# Patient Record
Sex: FEMALE | Race: WHITE | Employment: FULL TIME | URBAN - METROPOLITAN AREA
[De-identification: names, ages, dates, MRNs, and addresses within clinical notes are randomized per-mention and may not be internally consistent; named-entity substitution may affect disease eponyms.]

---

## 2017-10-23 ENCOUNTER — APPOINTMENT (OUTPATIENT)
Dept: SOCIAL WORK | Facility: CLINIC | Age: 27
End: 2017-10-23
Payer: COMMERCIAL

## 2017-10-23 PROCEDURE — 90686 IIV4 VACC NO PRSV 0.5 ML IM: CPT | Performed by: REGISTERED NURSE

## 2017-10-23 PROCEDURE — 90471 IMMUNIZATION ADMIN: CPT | Performed by: REGISTERED NURSE

## 2017-12-11 ENCOUNTER — HOSPITAL ENCOUNTER (OUTPATIENT)
Dept: LAB | Facility: MEDICAL CENTER | Age: 27
End: 2017-12-11
Attending: OBSTETRICS & GYNECOLOGY
Payer: COMMERCIAL

## 2017-12-11 LAB
ABO GROUP BLD: NORMAL
APPEARANCE UR: ABNORMAL
BACTERIA #/AREA URNS HPF: ABNORMAL /HPF
BASOPHILS # BLD AUTO: 0.5 % (ref 0–1.8)
BASOPHILS # BLD: 0.04 K/UL (ref 0–0.12)
BILIRUB UR QL STRIP.AUTO: NEGATIVE
BLD GP AB SCN SERPL QL: NORMAL
COLOR UR: ABNORMAL
CULTURE IF INDICATED INDCX: YES UA CULTURE
EOSINOPHIL # BLD AUTO: 0.09 K/UL (ref 0–0.51)
EOSINOPHIL NFR BLD: 1.2 % (ref 0–6.9)
EPI CELLS #/AREA URNS HPF: ABNORMAL /HPF
ERYTHROCYTE [DISTWIDTH] IN BLOOD BY AUTOMATED COUNT: 41.5 FL (ref 35.9–50)
GLUCOSE UR STRIP.AUTO-MCNC: NEGATIVE MG/DL
HBV SURFACE AG SER QL: NEGATIVE
HCT VFR BLD AUTO: 40.9 % (ref 37–47)
HCV AB SER QL: NEGATIVE
HGB BLD-MCNC: 13.8 G/DL (ref 12–16)
HIV 1+2 AB+HIV1 P24 AG SERPL QL IA: NON REACTIVE
HYALINE CASTS #/AREA URNS LPF: ABNORMAL /LPF
IMM GRANULOCYTES # BLD AUTO: 0.02 K/UL (ref 0–0.11)
IMM GRANULOCYTES NFR BLD AUTO: 0.3 % (ref 0–0.9)
KETONES UR STRIP.AUTO-MCNC: NEGATIVE MG/DL
LEUKOCYTE ESTERASE UR QL STRIP.AUTO: NEGATIVE
LYMPHOCYTES # BLD AUTO: 1.86 K/UL (ref 1–4.8)
LYMPHOCYTES NFR BLD: 24.3 % (ref 22–41)
MCH RBC QN AUTO: 27.4 PG (ref 27–33)
MCHC RBC AUTO-ENTMCNC: 33.7 G/DL (ref 33.6–35)
MCV RBC AUTO: 81.3 FL (ref 81.4–97.8)
MICRO URNS: ABNORMAL
MONOCYTES # BLD AUTO: 0.44 K/UL (ref 0–0.85)
MONOCYTES NFR BLD AUTO: 5.8 % (ref 0–13.4)
NEUTROPHILS # BLD AUTO: 5.2 K/UL (ref 2–7.15)
NEUTROPHILS NFR BLD: 67.9 % (ref 44–72)
NITRITE UR QL STRIP.AUTO: NEGATIVE
NRBC # BLD AUTO: 0 K/UL
NRBC BLD AUTO-RTO: 0 /100 WBC
PH UR STRIP.AUTO: 8 [PH]
PLATELET # BLD AUTO: 250 K/UL (ref 164–446)
PMV BLD AUTO: 10.2 FL (ref 9–12.9)
PROT UR QL STRIP: NEGATIVE MG/DL
RBC # BLD AUTO: 5.03 M/UL (ref 4.2–5.4)
RBC # URNS HPF: ABNORMAL /HPF
RBC UR QL AUTO: NEGATIVE
RH BLD: NORMAL
RUBV AB SER QL: 140.3 IU/ML
SP GR UR STRIP.AUTO: 1.03
TREPONEMA PALLIDUM IGG+IGM AB [PRESENCE] IN SERUM OR PLASMA BY IMMUNOASSAY: NON REACTIVE
UROBILINOGEN UR STRIP.AUTO-MCNC: 1 MG/DL
WBC # BLD AUTO: 7.7 K/UL (ref 4.8–10.8)
WBC #/AREA URNS HPF: ABNORMAL /HPF

## 2017-12-11 PROCEDURE — 36415 COLL VENOUS BLD VENIPUNCTURE: CPT

## 2017-12-11 PROCEDURE — 86850 RBC ANTIBODY SCREEN: CPT

## 2017-12-11 PROCEDURE — 87340 HEPATITIS B SURFACE AG IA: CPT

## 2017-12-11 PROCEDURE — 86900 BLOOD TYPING SEROLOGIC ABO: CPT

## 2017-12-11 PROCEDURE — 86762 RUBELLA ANTIBODY: CPT

## 2017-12-11 PROCEDURE — 86803 HEPATITIS C AB TEST: CPT

## 2017-12-11 PROCEDURE — 87389 HIV-1 AG W/HIV-1&-2 AB AG IA: CPT

## 2017-12-11 PROCEDURE — 87086 URINE CULTURE/COLONY COUNT: CPT

## 2017-12-11 PROCEDURE — 86901 BLOOD TYPING SEROLOGIC RH(D): CPT

## 2017-12-11 PROCEDURE — 83036 HEMOGLOBIN GLYCOSYLATED A1C: CPT

## 2017-12-11 PROCEDURE — 86780 TREPONEMA PALLIDUM: CPT

## 2017-12-11 PROCEDURE — 85025 COMPLETE CBC W/AUTO DIFF WBC: CPT

## 2017-12-11 PROCEDURE — 81001 URINALYSIS AUTO W/SCOPE: CPT

## 2017-12-12 LAB
EST. AVERAGE GLUCOSE BLD GHB EST-MCNC: 105 MG/DL
HBA1C MFR BLD: 5.3 % (ref 0–5.6)

## 2017-12-13 LAB
BACTERIA UR CULT: NORMAL
SIGNIFICANT IND 70042: NORMAL
SOURCE SOURCE: NORMAL

## 2018-04-05 ENCOUNTER — HOSPITAL ENCOUNTER (OUTPATIENT)
Dept: LAB | Facility: MEDICAL CENTER | Age: 28
End: 2018-04-05
Attending: OBSTETRICS & GYNECOLOGY
Payer: COMMERCIAL

## 2018-04-05 LAB
BASOPHILS # BLD AUTO: 0.4 % (ref 0–1.8)
BASOPHILS # BLD: 0.04 K/UL (ref 0–0.12)
EOSINOPHIL # BLD AUTO: 0.1 K/UL (ref 0–0.51)
EOSINOPHIL NFR BLD: 1 % (ref 0–6.9)
ERYTHROCYTE [DISTWIDTH] IN BLOOD BY AUTOMATED COUNT: 44 FL (ref 35.9–50)
GLUCOSE 1H P 50 G GLC PO SERPL-MCNC: 119 MG/DL (ref 70–139)
HCT VFR BLD AUTO: 39.2 % (ref 37–47)
HGB BLD-MCNC: 12.7 G/DL (ref 12–16)
IMM GRANULOCYTES # BLD AUTO: 0.07 K/UL (ref 0–0.11)
IMM GRANULOCYTES NFR BLD AUTO: 0.7 % (ref 0–0.9)
LYMPHOCYTES # BLD AUTO: 1.63 K/UL (ref 1–4.8)
LYMPHOCYTES NFR BLD: 17 % (ref 22–41)
MCH RBC QN AUTO: 27.5 PG (ref 27–33)
MCHC RBC AUTO-ENTMCNC: 32.4 G/DL (ref 33.6–35)
MCV RBC AUTO: 84.8 FL (ref 81.4–97.8)
MONOCYTES # BLD AUTO: 0.54 K/UL (ref 0–0.85)
MONOCYTES NFR BLD AUTO: 5.6 % (ref 0–13.4)
NEUTROPHILS # BLD AUTO: 7.19 K/UL (ref 2–7.15)
NEUTROPHILS NFR BLD: 75.3 % (ref 44–72)
NRBC # BLD AUTO: 0 K/UL
NRBC BLD-RTO: 0 /100 WBC
PLATELET # BLD AUTO: 238 K/UL (ref 164–446)
PMV BLD AUTO: 10.9 FL (ref 9–12.9)
RBC # BLD AUTO: 4.62 M/UL (ref 4.2–5.4)
WBC # BLD AUTO: 9.6 K/UL (ref 4.8–10.8)

## 2018-04-05 PROCEDURE — 85025 COMPLETE CBC W/AUTO DIFF WBC: CPT

## 2018-04-05 PROCEDURE — 36415 COLL VENOUS BLD VENIPUNCTURE: CPT

## 2018-04-05 PROCEDURE — 82950 GLUCOSE TEST: CPT

## 2018-06-21 ENCOUNTER — HOSPITAL ENCOUNTER (INPATIENT)
Facility: MEDICAL CENTER | Age: 28
LOS: 3 days | End: 2018-06-24
Attending: OBSTETRICS & GYNECOLOGY | Admitting: OBSTETRICS & GYNECOLOGY
Payer: COMMERCIAL

## 2018-06-21 DIAGNOSIS — G89.18 POSTOPERATIVE PAIN: ICD-10-CM

## 2018-06-21 LAB
BASOPHILS # BLD AUTO: 0.3 % (ref 0–1.8)
BASOPHILS # BLD: 0.04 K/UL (ref 0–0.12)
EOSINOPHIL # BLD AUTO: 0.08 K/UL (ref 0–0.51)
EOSINOPHIL NFR BLD: 0.7 % (ref 0–6.9)
ERYTHROCYTE [DISTWIDTH] IN BLOOD BY AUTOMATED COUNT: 43.8 FL (ref 35.9–50)
HCT VFR BLD AUTO: 39 % (ref 37–47)
HGB BLD-MCNC: 13 G/DL (ref 12–16)
HOLDING TUBE BB 8507: NORMAL
IMM GRANULOCYTES # BLD AUTO: 0.16 K/UL (ref 0–0.11)
IMM GRANULOCYTES NFR BLD AUTO: 1.3 % (ref 0–0.9)
LYMPHOCYTES # BLD AUTO: 1.74 K/UL (ref 1–4.8)
LYMPHOCYTES NFR BLD: 14.6 % (ref 22–41)
MCH RBC QN AUTO: 27 PG (ref 27–33)
MCHC RBC AUTO-ENTMCNC: 33.3 G/DL (ref 33.6–35)
MCV RBC AUTO: 81.1 FL (ref 81.4–97.8)
MONOCYTES # BLD AUTO: 0.71 K/UL (ref 0–0.85)
MONOCYTES NFR BLD AUTO: 6 % (ref 0–13.4)
NEUTROPHILS # BLD AUTO: 9.19 K/UL (ref 2–7.15)
NEUTROPHILS NFR BLD: 77.1 % (ref 44–72)
NRBC # BLD AUTO: 0 K/UL
NRBC BLD-RTO: 0 /100 WBC
PLATELET # BLD AUTO: 202 K/UL (ref 164–446)
PMV BLD AUTO: 11 FL (ref 9–12.9)
RBC # BLD AUTO: 4.81 M/UL (ref 4.2–5.4)
WBC # BLD AUTO: 11.9 K/UL (ref 4.8–10.8)

## 2018-06-21 PROCEDURE — 700102 HCHG RX REV CODE 250 W/ 637 OVERRIDE(OP): Performed by: OBSTETRICS & GYNECOLOGY

## 2018-06-21 PROCEDURE — 4A1HXCZ MONITORING OF PRODUCTS OF CONCEPTION, CARDIAC RATE, EXTERNAL APPROACH: ICD-10-PCS | Performed by: OBSTETRICS & GYNECOLOGY

## 2018-06-21 PROCEDURE — 700105 HCHG RX REV CODE 258: Performed by: ANESTHESIOLOGY

## 2018-06-21 PROCEDURE — 302135 SEQUENTIAL COMPRESSION MACHINE: Performed by: OBSTETRICS & GYNECOLOGY

## 2018-06-21 PROCEDURE — 700111 HCHG RX REV CODE 636 W/ 250 OVERRIDE (IP)

## 2018-06-21 PROCEDURE — 304966 HCHG RECOVERY SVSC TIME ADDL 1/2 HR: Performed by: OBSTETRICS & GYNECOLOGY

## 2018-06-21 PROCEDURE — 700102 HCHG RX REV CODE 250 W/ 637 OVERRIDE(OP): Performed by: ANESTHESIOLOGY

## 2018-06-21 PROCEDURE — 36415 COLL VENOUS BLD VENIPUNCTURE: CPT

## 2018-06-21 PROCEDURE — 305385 HCHG SURGICAL SERVICES 1/4 HOUR: Performed by: OBSTETRICS & GYNECOLOGY

## 2018-06-21 PROCEDURE — 59514 CESAREAN DELIVERY ONLY: CPT

## 2018-06-21 PROCEDURE — A9270 NON-COVERED ITEM OR SERVICE: HCPCS | Performed by: OBSTETRICS & GYNECOLOGY

## 2018-06-21 PROCEDURE — 700101 HCHG RX REV CODE 250

## 2018-06-21 PROCEDURE — 770002 HCHG ROOM/CARE - OB PRIVATE (112)

## 2018-06-21 PROCEDURE — 700111 HCHG RX REV CODE 636 W/ 250 OVERRIDE (IP): Performed by: ANESTHESIOLOGY

## 2018-06-21 PROCEDURE — 304964 HCHG RECOVERY ROOM TIME 1HR: Performed by: OBSTETRICS & GYNECOLOGY

## 2018-06-21 PROCEDURE — 85025 COMPLETE CBC W/AUTO DIFF WBC: CPT

## 2018-06-21 PROCEDURE — 306287 HCHG RETRACTOR C SECTION XL

## 2018-06-21 PROCEDURE — 306828 HCHG ANES-TIME GENERAL: Performed by: OBSTETRICS & GYNECOLOGY

## 2018-06-21 RX ORDER — METOCLOPRAMIDE HYDROCHLORIDE 5 MG/ML
10 INJECTION INTRAMUSCULAR; INTRAVENOUS EVERY 6 HOURS PRN
Status: CANCELLED | OUTPATIENT
Start: 2018-06-21

## 2018-06-21 RX ORDER — OXYCODONE AND ACETAMINOPHEN 10; 325 MG/1; MG/1
1 TABLET ORAL EVERY 4 HOURS PRN
Status: DISCONTINUED | OUTPATIENT
Start: 2018-06-21 | End: 2018-06-24 | Stop reason: HOSPADM

## 2018-06-21 RX ORDER — SODIUM CHLORIDE, SODIUM GLUCONATE, SODIUM ACETATE, POTASSIUM CHLORIDE AND MAGNESIUM CHLORIDE 526; 502; 368; 37; 30 MG/100ML; MG/100ML; MG/100ML; MG/100ML; MG/100ML
1500 INJECTION, SOLUTION INTRAVENOUS ONCE
Status: COMPLETED | OUTPATIENT
Start: 2018-06-21 | End: 2018-06-21

## 2018-06-21 RX ORDER — METHYLERGONOVINE MALEATE 0.2 MG/ML
0.2 INJECTION INTRAVENOUS
Status: DISCONTINUED | OUTPATIENT
Start: 2018-06-21 | End: 2018-06-21 | Stop reason: HOSPADM

## 2018-06-21 RX ORDER — CARBOPROST TROMETHAMINE 250 UG/ML
250 INJECTION, SOLUTION INTRAMUSCULAR
Status: DISCONTINUED | OUTPATIENT
Start: 2018-06-21 | End: 2018-06-24 | Stop reason: HOSPADM

## 2018-06-21 RX ORDER — METHYLERGONOVINE MALEATE 0.2 MG/ML
0.2 INJECTION INTRAVENOUS
Status: DISCONTINUED | OUTPATIENT
Start: 2018-06-21 | End: 2018-06-24 | Stop reason: HOSPADM

## 2018-06-21 RX ORDER — ONDANSETRON 4 MG/1
4 TABLET, ORALLY DISINTEGRATING ORAL EVERY 6 HOURS PRN
Status: DISCONTINUED | OUTPATIENT
Start: 2018-06-21 | End: 2018-06-24 | Stop reason: HOSPADM

## 2018-06-21 RX ORDER — MISOPROSTOL 200 UG/1
600 TABLET ORAL
Status: DISCONTINUED | OUTPATIENT
Start: 2018-06-21 | End: 2018-06-24 | Stop reason: HOSPADM

## 2018-06-21 RX ORDER — ACETAMINOPHEN 325 MG/1
325 TABLET ORAL EVERY 4 HOURS PRN
Status: CANCELLED | OUTPATIENT
Start: 2018-06-21

## 2018-06-21 RX ORDER — CITRIC ACID/SODIUM CITRATE 334-500MG
30 SOLUTION, ORAL ORAL ONCE
Status: COMPLETED | OUTPATIENT
Start: 2018-06-21 | End: 2018-06-21

## 2018-06-21 RX ORDER — ONDANSETRON 2 MG/ML
4 INJECTION INTRAMUSCULAR; INTRAVENOUS EVERY 6 HOURS PRN
Status: DISCONTINUED | OUTPATIENT
Start: 2018-06-21 | End: 2018-06-24 | Stop reason: HOSPADM

## 2018-06-21 RX ORDER — OXYCODONE HYDROCHLORIDE AND ACETAMINOPHEN 5; 325 MG/1; MG/1
2 TABLET ORAL EVERY 4 HOURS PRN
Status: DISCONTINUED | OUTPATIENT
Start: 2018-06-21 | End: 2018-06-24 | Stop reason: HOSPADM

## 2018-06-21 RX ORDER — IBUPROFEN 600 MG/1
600 TABLET ORAL EVERY 6 HOURS PRN
Status: DISCONTINUED | OUTPATIENT
Start: 2018-06-21 | End: 2018-06-24 | Stop reason: HOSPADM

## 2018-06-21 RX ORDER — METOCLOPRAMIDE HYDROCHLORIDE 5 MG/ML
10 INJECTION INTRAMUSCULAR; INTRAVENOUS ONCE
Status: COMPLETED | OUTPATIENT
Start: 2018-06-21 | End: 2018-06-21

## 2018-06-21 RX ORDER — SIMETHICONE 80 MG
80 TABLET,CHEWABLE ORAL 4 TIMES DAILY PRN
Status: CANCELLED | OUTPATIENT
Start: 2018-06-21

## 2018-06-21 RX ORDER — OXYCODONE HYDROCHLORIDE AND ACETAMINOPHEN 5; 325 MG/1; MG/1
1 TABLET ORAL EVERY 4 HOURS PRN
Status: CANCELLED | OUTPATIENT
Start: 2018-06-21

## 2018-06-21 RX ORDER — MAG HYDROX/ALUMINUM HYD/SIMETH 400-400-40
1 SUSPENSION, ORAL (FINAL DOSE FORM) ORAL
Status: DISCONTINUED | OUTPATIENT
Start: 2018-06-21 | End: 2018-06-24 | Stop reason: HOSPADM

## 2018-06-21 RX ORDER — MEPERIDINE HYDROCHLORIDE 25 MG/ML
12.5 INJECTION INTRAMUSCULAR; INTRAVENOUS; SUBCUTANEOUS ONCE
Status: COMPLETED | OUTPATIENT
Start: 2018-06-21 | End: 2018-06-21

## 2018-06-21 RX ORDER — SODIUM CHLORIDE, SODIUM LACTATE, POTASSIUM CHLORIDE, CALCIUM CHLORIDE 600; 310; 30; 20 MG/100ML; MG/100ML; MG/100ML; MG/100ML
INJECTION, SOLUTION INTRAVENOUS PRN
Status: DISCONTINUED | OUTPATIENT
Start: 2018-06-21 | End: 2018-06-24 | Stop reason: HOSPADM

## 2018-06-21 RX ORDER — KETOROLAC TROMETHAMINE 30 MG/ML
INJECTION, SOLUTION INTRAMUSCULAR; INTRAVENOUS
Status: COMPLETED
Start: 2018-06-21 | End: 2018-06-21

## 2018-06-21 RX ORDER — SODIUM CHLORIDE, SODIUM LACTATE, POTASSIUM CHLORIDE, CALCIUM CHLORIDE 600; 310; 30; 20 MG/100ML; MG/100ML; MG/100ML; MG/100ML
INJECTION, SOLUTION INTRAVENOUS CONTINUOUS
Status: DISCONTINUED | OUTPATIENT
Start: 2018-06-21 | End: 2018-06-21 | Stop reason: HOSPADM

## 2018-06-21 RX ORDER — DOCUSATE SODIUM 100 MG/1
100 CAPSULE, LIQUID FILLED ORAL 2 TIMES DAILY PRN
Status: CANCELLED | OUTPATIENT
Start: 2018-06-21

## 2018-06-21 RX ORDER — METHYLERGONOVINE MALEATE 0.2 MG/ML
0.2 INJECTION INTRAVENOUS
Status: CANCELLED | OUTPATIENT
Start: 2018-06-21

## 2018-06-21 RX ORDER — KETOROLAC TROMETHAMINE 30 MG/ML
30 INJECTION, SOLUTION INTRAMUSCULAR; INTRAVENOUS ONCE
Status: COMPLETED | OUTPATIENT
Start: 2018-06-21 | End: 2018-06-21

## 2018-06-21 RX ORDER — MISOPROSTOL 200 UG/1
1000 TABLET ORAL
Status: DISCONTINUED | OUTPATIENT
Start: 2018-06-21 | End: 2018-06-21 | Stop reason: HOSPADM

## 2018-06-21 RX ORDER — DOCUSATE SODIUM 100 MG/1
100 CAPSULE, LIQUID FILLED ORAL 2 TIMES DAILY PRN
Status: DISCONTINUED | OUTPATIENT
Start: 2018-06-21 | End: 2018-06-24 | Stop reason: HOSPADM

## 2018-06-21 RX ORDER — ACETAMINOPHEN 325 MG/1
650 TABLET ORAL EVERY 4 HOURS PRN
Status: DISCONTINUED | OUTPATIENT
Start: 2018-06-21 | End: 2018-06-24 | Stop reason: HOSPADM

## 2018-06-21 RX ORDER — SODIUM CHLORIDE, SODIUM LACTATE, POTASSIUM CHLORIDE, CALCIUM CHLORIDE 600; 310; 30; 20 MG/100ML; MG/100ML; MG/100ML; MG/100ML
INJECTION, SOLUTION INTRAVENOUS PRN
Status: CANCELLED | OUTPATIENT
Start: 2018-06-21

## 2018-06-21 RX ORDER — MEPERIDINE HYDROCHLORIDE 25 MG/ML
INJECTION INTRAMUSCULAR; INTRAVENOUS; SUBCUTANEOUS
Status: COMPLETED
Start: 2018-06-21 | End: 2018-06-21

## 2018-06-21 RX ORDER — OXYCODONE HCL 5 MG/5 ML
10 SOLUTION, ORAL ORAL
Status: DISCONTINUED | OUTPATIENT
Start: 2018-06-21 | End: 2018-06-24 | Stop reason: HOSPADM

## 2018-06-21 RX ADMIN — KETOROLAC TROMETHAMINE 30 MG: 30 INJECTION, SOLUTION INTRAMUSCULAR; INTRAVENOUS at 20:52

## 2018-06-21 RX ADMIN — METOCLOPRAMIDE 10 MG: 5 INJECTION, SOLUTION INTRAMUSCULAR; INTRAVENOUS at 19:10

## 2018-06-21 RX ADMIN — SODIUM CHLORIDE, SODIUM GLUCONATE, SODIUM ACETATE, POTASSIUM CHLORIDE AND MAGNESIUM CHLORIDE 1500 ML: 526; 502; 368; 37; 30 INJECTION, SOLUTION INTRAVENOUS at 18:27

## 2018-06-21 RX ADMIN — KETOROLAC TROMETHAMINE 30 MG: 30 INJECTION, SOLUTION INTRAMUSCULAR at 20:52

## 2018-06-21 RX ADMIN — FENTANYL CITRATE 25 MCG: 50 INJECTION INTRAMUSCULAR; INTRAVENOUS at 21:56

## 2018-06-21 RX ADMIN — FAMOTIDINE 20 MG: 10 INJECTION INTRAVENOUS at 19:09

## 2018-06-21 RX ADMIN — MISOPROSTOL 1000 MCG: 200 TABLET ORAL at 20:21

## 2018-06-21 RX ADMIN — MEPERIDINE HYDROCHLORIDE 12.5 MG: 25 INJECTION INTRAMUSCULAR; INTRAVENOUS; SUBCUTANEOUS at 20:52

## 2018-06-21 RX ADMIN — SODIUM CITRATE AND CITRIC ACID MONOHYDRATE 30 ML: 500; 334 SOLUTION ORAL at 19:09

## 2018-06-21 RX ADMIN — FENTANYL CITRATE 75 MCG: 50 INJECTION, SOLUTION INTRAMUSCULAR; INTRAVENOUS at 21:12

## 2018-06-21 ASSESSMENT — PAIN SCALES - GENERAL
PAINLEVEL_OUTOF10: 2
PAINLEVEL_OUTOF10: 3
PAINLEVEL_OUTOF10: 4
PAINLEVEL_OUTOF10: 8

## 2018-06-21 ASSESSMENT — PATIENT HEALTH QUESTIONNAIRE - PHQ9
SUM OF ALL RESPONSES TO PHQ9 QUESTIONS 1 AND 2: 0
2. FEELING DOWN, DEPRESSED, IRRITABLE, OR HOPELESS: NOT AT ALL
1. LITTLE INTEREST OR PLEASURE IN DOING THINGS: NOT AT ALL

## 2018-06-21 ASSESSMENT — COPD QUESTIONNAIRES
DURING THE PAST 4 WEEKS HOW MUCH DID YOU FEEL SHORT OF BREATH: NONE/LITTLE OF THE TIME
IN THE PAST 12 MONTHS DO YOU DO LESS THAN YOU USED TO BECAUSE OF YOUR BREATHING PROBLEMS: DISAGREE/UNSURE
HAVE YOU SMOKED AT LEAST 100 CIGARETTES IN YOUR ENTIRE LIFE: NO/DON'T KNOW
DO YOU EVER COUGH UP ANY MUCUS OR PHLEGM?: NO/ONLY WITH OCCASIONAL COLDS OR INFECTIONS
COPD SCREENING SCORE: 0

## 2018-06-21 ASSESSMENT — LIFESTYLE VARIABLES
ALCOHOL_USE: NO
EVER_SMOKED: NEVER

## 2018-06-22 LAB
ERYTHROCYTE [DISTWIDTH] IN BLOOD BY AUTOMATED COUNT: 42.9 FL (ref 35.9–50)
HCT VFR BLD AUTO: 31.6 % (ref 37–47)
HGB BLD-MCNC: 10.6 G/DL (ref 12–16)
MCH RBC QN AUTO: 27 PG (ref 27–33)
MCHC RBC AUTO-ENTMCNC: 33.5 G/DL (ref 33.6–35)
MCV RBC AUTO: 80.6 FL (ref 81.4–97.8)
PLATELET # BLD AUTO: 164 K/UL (ref 164–446)
PMV BLD AUTO: 9.8 FL (ref 9–12.9)
RBC # BLD AUTO: 3.92 M/UL (ref 4.2–5.4)
WBC # BLD AUTO: 12.1 K/UL (ref 4.8–10.8)

## 2018-06-22 PROCEDURE — A9270 NON-COVERED ITEM OR SERVICE: HCPCS | Performed by: OBSTETRICS & GYNECOLOGY

## 2018-06-22 PROCEDURE — 770002 HCHG ROOM/CARE - OB PRIVATE (112)

## 2018-06-22 PROCEDURE — 36415 COLL VENOUS BLD VENIPUNCTURE: CPT

## 2018-06-22 PROCEDURE — 85027 COMPLETE CBC AUTOMATED: CPT

## 2018-06-22 PROCEDURE — 700102 HCHG RX REV CODE 250 W/ 637 OVERRIDE(OP): Performed by: OBSTETRICS & GYNECOLOGY

## 2018-06-22 RX ADMIN — IBUPROFEN 600 MG: 600 TABLET, FILM COATED ORAL at 02:46

## 2018-06-22 RX ADMIN — OXYCODONE HYDROCHLORIDE AND ACETAMINOPHEN 1 TABLET: 10; 325 TABLET ORAL at 09:50

## 2018-06-22 RX ADMIN — OXYCODONE HYDROCHLORIDE AND ACETAMINOPHEN 1 TABLET: 10; 325 TABLET ORAL at 00:33

## 2018-06-22 RX ADMIN — OXYCODONE HYDROCHLORIDE AND ACETAMINOPHEN 1 TABLET: 10; 325 TABLET ORAL at 04:43

## 2018-06-22 RX ADMIN — OXYCODONE HYDROCHLORIDE AND ACETAMINOPHEN 1 TABLET: 10; 325 TABLET ORAL at 19:54

## 2018-06-22 RX ADMIN — OXYCODONE AND ACETAMINOPHEN 2 TABLET: 5; 325 TABLET ORAL at 14:05

## 2018-06-22 RX ADMIN — IBUPROFEN 600 MG: 600 TABLET, FILM COATED ORAL at 19:54

## 2018-06-22 RX ADMIN — IBUPROFEN 600 MG: 600 TABLET, FILM COATED ORAL at 09:51

## 2018-06-22 ASSESSMENT — PAIN SCALES - GENERAL
PAINLEVEL_OUTOF10: 0
PAINLEVEL_OUTOF10: 1
PAINLEVEL_OUTOF10: 7
PAINLEVEL_OUTOF10: 7
PAINLEVEL_OUTOF10: 2
PAINLEVEL_OUTOF10: 7
PAINLEVEL_OUTOF10: 2
PAINLEVEL_OUTOF10: 2
PAINLEVEL_OUTOF10: 6
PAINLEVEL_OUTOF10: 7

## 2018-06-22 ASSESSMENT — PATIENT HEALTH QUESTIONNAIRE - PHQ9
SUM OF ALL RESPONSES TO PHQ9 QUESTIONS 1 AND 2: 0
2. FEELING DOWN, DEPRESSED, IRRITABLE, OR HOPELESS: NOT AT ALL
SUM OF ALL RESPONSES TO PHQ9 QUESTIONS 1 AND 2: 0
2. FEELING DOWN, DEPRESSED, IRRITABLE, OR HOPELESS: NOT AT ALL
1. LITTLE INTEREST OR PLEASURE IN DOING THINGS: NOT AT ALL
1. LITTLE INTEREST OR PLEASURE IN DOING THINGS: NOT AT ALL

## 2018-06-22 NOTE — PROGRESS NOTES
"L&D Progress Note    Name:   Jenna Unger   Date/Time:  6/21/2018 7:09 PM  Gestational Age:  Unknown  Admit Date:   6/21/2018  Admitting Dx:   Pregnancy  Labor and delivery, indication for care  Repeat C/S, Laboring/Term    Subjective:  Pt is feeling more painful cramps that worsened about an hour ago.   Uterine contractions: yes  Pain:    yes    Objective:   Vitals:    06/21/18 1250   BP: 133/67   Pulse: 87   Weight: (!) 125.6 kg (277 lb)   Height: 1.651 m (5' 5\")     FHT:140's category  [place]: q 3mins    Pt wants to proceed with repeat c/s. She has had a previous c/s and her contractions are worsening. Proceed with Repeat c/s. R/B/I/A d/w pt and spouse and all questions answered.         Labs:  Recent Results (from the past 72 hour(s))   Hold Blood Bank Specimen (Not Tested)    Collection Time: 06/21/18  2:00 PM   Result Value Ref Range    Holding Tube - Bb DONE    CBC with Differential    Collection Time: 06/21/18  2:00 PM   Result Value Ref Range    WBC 11.9 (H) 4.8 - 10.8 K/uL    RBC 4.81 4.20 - 5.40 M/uL    Hemoglobin 13.0 12.0 - 16.0 g/dL    Hematocrit 39.0 37.0 - 47.0 %    MCV 81.1 (L) 81.4 - 97.8 fL    MCH 27.0 27.0 - 33.0 pg    MCHC 33.3 (L) 33.6 - 35.0 g/dL    RDW 43.8 35.9 - 50.0 fL    Platelet Count 202 164 - 446 K/uL    MPV 11.0 9.0 - 12.9 fL    Neutrophils-Polys 77.10 (H) 44.00 - 72.00 %    Lymphocytes 14.60 (L) 22.00 - 41.00 %    Monocytes 6.00 0.00 - 13.40 %    Eosinophils 0.70 0.00 - 6.90 %    Basophils 0.30 0.00 - 1.80 %    Immature Granulocytes 1.30 (H) 0.00 - 0.90 %    Nucleated RBC 0.00 /100 WBC    Neutrophils (Absolute) 9.19 (H) 2.00 - 7.15 K/uL    Lymphs (Absolute) 1.74 1.00 - 4.80 K/uL    Monos (Absolute) 0.71 0.00 - 0.85 K/uL    Eos (Absolute) 0.08 0.00 - 0.51 K/uL    Baso (Absolute) 0.04 0.00 - 0.12 K/uL    Immature Granulocytes (abs) 0.16 (H) 0.00 - 0.11 K/uL    NRBC (Absolute) 0.00 K/uL         Assessment:   Gestational Age:   Term pregnancy    Pregnancy " Complications: Obesity    Plan:    Anticipate  for previous c/s and laboring at 3 cm dilated.  delivery type    There are no active problems to display for this patient.      Rosemarie Penn M.D.

## 2018-06-22 NOTE — PROGRESS NOTES
1245-pt presents from the office for repeat  section, pt states that she was checked in the office and was told that she was 3 cm and that she was 2 cm last week, so Dr Penn wants to do her c/s today, pt states that she is feeling cramps but that they are not very strong, no c/o leaking, bleeding or other pain, states baby is moving normally, placed on external monitors, vs taken, SVE 3/70/-2  1330-TC Dr Penn, report given, no cervical change from the office this morning, admission for c/s ordered received   1400-report given to ALISSON Gonsalez RN, POC discussed

## 2018-06-22 NOTE — OR SURGEON
Immediate Post OP Note    PreOp Diagnosis: iup at 37.6  Previous c/s  Early labor with worsening contractions  Morbidly obese    PostOp Diagnosis: same  Uterine atony. Resolved with IV pitocin, methergine IM x1,cytotec 1,000 mcg per rectum and 1 gram traxanemic acid    Procedure(s):  REPEAT C SECTION    Surgeon(s):  Rosemarie Penn M.D.    Anesthesiologist/Type of Anesthesia:  Anesthesiologist: (Unknown)/Failed Spinal/Failed epidural, GETA  Surgical Staff:  Circulator: (Unknown)  Scrub Person: (Unknown)    Specimens removed if any:  gases    Estimated Blood Loss: 1 liter  UO: 125 cc clear  IVF: 2 liters LR    Findings:vtx, female, clear nomi, apgars 6 and 8, 6 #8oz, nml tubes, ovaries and uterus    Complications: none        6/21/2018 8:36 PM Rosemarie Penn M.D.

## 2018-06-22 NOTE — PROGRESS NOTES
"Received bedside report  from \"RAD Norton\"  on patient and assumed care.  Pt denies any pain or any questions or needs at this time.  "

## 2018-06-22 NOTE — PROGRESS NOTES
Sequentials off, Assisted out of bed, sat on toilet, pericare done, nicholson removed.  Advised she needs to drink more water and void in hat within 6 hours and pt verbalizes understanding.

## 2018-06-22 NOTE — PROGRESS NOTES
"1400 - report from MALIA Dominguez RN. FOB Tl at bedside. POC discussed, questions encouraged and answered, understanding verbalized. Plan currently to go back for repeat c/s for active labor, after 1700 case when Dr. Penn is through with clinic. States she is not feeling pain with UCs but does know they are happening, appears comfortable and not working with them, watching TV and talking with FOB.   1445 - during admit profile, pt states she has not eaten since 1730 yesterday, states MD instructed her to be NPO after midnight at appt in the office on Friday 6/15, \"just in case\" she was laboring when she went to scheduled appt in the office today.    1740 - reporting not feeling all UCs showing up on monitor, unaware some are occurring. SVE done by MALIA Dominguez RN for continuity, no change.   1900 - Dr. Penn on unit, updated on 1740 cervical exam, that pt had not made any change since exam in the office this morning, no change in POC.   1920 - in OR1 for repeat c/s (see OR charting). Dr. Gansert unsuccessful with spinal, epidural not providing adequate pain relief, converted to general anesthesia.    1944 - in PACU.  2052 - medicated for pain per MD order (see MAR).  2112 - medicated for pain per MD order (see MAR).   2156 - medicated for pain per MD order (see MAR).   2215 - pain well controlled, transferred to  via gurney, side-rails up x2. Report given to RAD Norton.   "

## 2018-06-22 NOTE — PROGRESS NOTES
Post Partum Progress Note    Name:   Jenna Unger   Date/Time:  2018 - 7:26 AM  Chief Admitting Dx:  Pregnancy  Labor and delivery, indication for care  Repeat C/S, Laboring  Delivery Type:   for repeat/labor  Post-Op/Post Partum Days #:  1    Subjective:  Pt doing well . Pt with adequate pain management. Abdominal pain: no  Ambulating:   yes  Tolerating liquids:  yes  Flatus:   no  BM:    no  Bleeding:   with a small amount of bleeding  Voiding:   no  Dizziness:   no  Feeding:   breast    Vitals:    18 2133 18 2148 18 2210 18 0440   BP: 138/67 139/74 138/77 138/74   Pulse: 88 92 92 91   Resp:   18 18   Temp:   36.6 °C (97.9 °F) 36.9 °C (98.4 °F)   TempSrc:       SpO2: 96% 98% 96% 96%   Weight:       Height:           Exam:  Breast: Tenderness no  Abdomen: Abdomen soft, non-tender. BS normal. No masses,  No organomegaly  Fundal Tenderness:  no  Fundus Firm: yes  Incision: dry and intact  Below umbilicus: yes  Perineum: perineum intact  Lochia: mild  Extremities: 1+ edema extremities, peripheral pulses and reflexes normal    Meds:  Current Facility-Administered Medications   Medication Dose   • oxytocin (PITOCIN) infusion (for postpartum)  2,000 mL/hr    Followed by   • oxytocin (PITOCIN) infusion (for postpartum)   mL/hr   • carboPROST (HEMABATE) injection 250 mcg  250 mcg   • ibuprofen (MOTRIN) tablet 600 mg  600 mg   • oxyCODONE-acetaminophen (PERCOCET-10)  MG per tablet 1 Tab  1 Tab   • LR infusion     • PRN oxytocin (PITOCIN) (20 Units/1000 mL) PRN for excessive uterine bleeding - See Admin Instr  125-999 mL/hr   • miSOPROStol (CYTOTEC) tablet 600 mcg  600 mcg   • methylergonovine (METHERGINE) injection 0.2 mg  0.2 mg   • carboPROST (HEMABATE) injection 250 mcg  250 mcg   • docusate sodium (COLACE) capsule 100 mg  100 mg   • glycerin (adult) suppository 1 Suppository  1 Suppository   • magnesium hydroxide (MILK OF MAGNESIA) suspension 30 mL  30 mL   •  acetaminophen (TYLENOL) tablet 650 mg  650 mg   • oxyCODONE-acetaminophen (PERCOCET) 5-325 MG per tablet 2 Tab  2 Tab   • ondansetron (ZOFRAN) syringe/vial injection 4 mg  4 mg    Or   • ondansetron (ZOFRAN ODT) dispertab 4 mg  4 mg   • oxyCODONE (ROXICODONE) oral solution 10 mg  10 mg       Labs:   Recent Labs      18   1400  18   0432   WBC  11.9*  12.1*   RBC  4.81  3.92*   HEMOGLOBIN  13.0  10.6*   HEMATOCRIT  39.0  31.6*   MCV  81.1*  80.6*   MCH  27.0  27.0   MCHC  33.3*  33.5*   RDW  43.8  42.9   PLATELETCT  202  164   MPV  11.0  9.8       Assessment:  Chief Admitting Dx:  Pregnancy  Labor and delivery, indication for care  Repeat C/S, Laboring  Delivery Type:   for repeat  Tubal Ligation:  no    Plan:  Continue routine post partum care.      Rosemarie Penn M.D.

## 2018-06-22 NOTE — PROGRESS NOTES
Report received. Assumed care. Transferred pt into bed with the use of the hover mat.  A/O x4. Pt responds appropriately.Denies SOB. Assessment complete. Fundus firm, lochia light.  Discussed POC, pain control, feedings, mobility, safety, DC planning , pt verbalizes understanding. Call light and belongings within reach. Bed in the lowest position. Treaded socks in place. Will continue to monitor .

## 2018-06-22 NOTE — CARE PLAN
Problem: Altered physiologic condition related to postoperative  delivery  Goal: Patient physiologically stable as evidenced by normal lochia, palpable uterine involution and vital signs within normal limits    Intervention: Massage fundus as necessary to prevent excessive lochia  Fundus firm. Lochia light at this time. Will monitor closely.      Problem: Alteration in comfort related to surgical incision and/or after birth pains  Goal: Patient verbalizes acceptable pain level  Keep comfortable at rest and with movement. Will assess pain level post administration.

## 2018-06-22 NOTE — OP REPORT
DATE OF SERVICE:  2018    PREOPERATIVE DIAGNOSES:  1.  Intrauterine pregnancy at 37 weeks and 6 days.  2.  Previous  section.  3.  Early labor with worsening contractions and 3 cm dilated.  4.  Morbidly obese with a BMI of 46.    POSTOPERATIVE DIAGNOSES:  1.  Intrauterine pregnancy at 37 weeks and 6 days.  2.  Previous  section.  3.  Early labor with worsening contractions and 3 cm dilated.  4.  Morbidly obese with a BMI of 46.  5.  Uterine atony that resolved with IV Pitocin, Methergine IM x1 and Cytotec   1000 mcg per rectum and 1 gram IV of tranexamic acid.    SURGEON:  Rosemarie Penn MD    ASSISTANT:  Lowell Delarosa MD    ANESTHESIOLOGIST:  Dr. Gansert.    TYPE OF ANESTHESIA:  Failed spinal.  Failed epidural.  Successful general   endotracheal anesthesia.    PROCEDURE:  Repeat low transverse  section via Pfannenstiel skin   incision.    INTRAVENOUS FLUIDS:  Two liters of LR.    URINE OUTPUT:  125 mL clear urine at the end of procedure.    ESTIMATED BLOOD LOSS:  1 L.    FINDINGS:  Clear amniotic fluid, vertex female with Apgars 6 pounds 8 ounces.    Normal tubes, ovaries, and uterus.    RECOVERY:  Stable to the PACU.    DESCRIPTION OF PROCEDURE:  Patient was taken to the operating room secondary   to laboring with a previous  section and therefore after consents were   signed, she was taken to the operating room.  She had a failed spinal and   therefore had an epidural that failed and therefore verbal consent was   obtained from the patient for general endotracheal anesthesia.  She was first   prepped and draped in the usual sterile fashion and then Dr. Gansert   administered general endotracheal anesthesia, which was successful.  At this   time, a Pfannenstiel skin incision was made with the scalpel and extended down   to the fascia with the Bovie.  Fascia incised in midline and extended   laterally with Carrero scissors.  The inferior aspect of the fascia was grasped   with  Kocher clamps, elevated, and tented up.  Carrero scissors were used to   separate the rectus from the fascia.  In a similar fashion, the superior   aspect of the fascia was grasped with Kocher clamps, elevated, and tented up.    Carrero scissors were used to separate the rectus from the fascia.  The midline   was identified and entered sharply with Metzenbaum scissors, extended   superiorly and inferiorly.  The large Ruben O was then introduced into the   pelvis and then at this time, the vesicouterine peritoneum was identified and   entered sharply with Metzenbaum scissors.  A low transverse uterine incision   was made with the scalpel.  She did have a small window on the uterus about 2   cm.  A low transverse incision was made with the scalpel and then the amniotic   sac was ruptured.  Infant's head was delivered atraumatically and the rest of   the infant delivered without any problems.  Mouth and nose were bulb   suctioned.  Cord double clamped and cut and infant handed over to awaiting   respiratory therapy team and L and D nurse team.  Cord gases were clamped and   set aside.  The placenta was delivered intact.  A 3-vessel cord was noted.    The uterus was cleared off all the debris and at this time, the hysterotomy   was approximated using 0 Vicryl on a CTX needle from left to right.  The   vesicouterine peritoneum was then approximated using 2-0 Vicryl on a CT1   needle from left to right.  The hysterotomy was then found to be hemostatic.    The patient had normal tubes and ovaries.  The pelvis was irrigated and again   the hysterotomy was found to be hemostatic.  Therefore, at this time, the   large Ruben O was removed from the pelvis and again the hysterotomy revealed   that it was hemostatic.  Therefore, at this time, the peritoneum was   approximated using 2-0 Vicryl on a CT1 needle.  The rectus muscle irrigated   and found to be hemostatic and then the fascia approximated using 0 Vicryl on   a CTX needle  from left to right.  Subcutaneous tissue was irrigated and then   approximated using a 2-0 Vicryl on a CT1 needle without any problems.    Everything was hemostatic and therefore the skin was approximated with 4-0   Vicryl on a Heladio needle.  Steri-Strips were placed.  Lap and needle counts   were correct x2.  The patient did have uterine atony that was encountered.    Once she was frog legged the uterus was found to be atonic and therefore, at   this time, the IV Pitocin was continued.  She was administered Methergine 0.2   mg IM x1.  I placed Cytotec in the rectum 1000 mcg x1 and the anesthesiologist   to give the patient 1 g IV of tranexamic acid.  Manual expression was   performed in the operating room before she was extubated revealing a lot of   clots and then after this, the uterus was found to be hemostatic with no more   active bleeding.  The patient was then extubated and taken to the recovery   room in stable condition.  Baby and mom are now doing well.       ____________________________________     MD HELDER MADRID / RANDY    DD:  06/22/2018 02:10:42  DT:  06/22/2018 04:01:24    D#:  [id number]  Job#:  [id number]    cc: BEAU COBIAN MD

## 2018-06-22 NOTE — H&P
DATE OF ADMISSION:  2018.    CHIEF COMPLAINT:  Worsening painful contractions and previous    section.    HISTORY OF PRESENT ILLNESS:  This patient is a 28-year-old  2, para 1   with a due date of 2018 by a 9-week ultrasound who has had prenatal care   with me.  The patient is morbidly obese.  She has had a previous    section for breech and desires elective repeat.  Patient came in to my office   last week and she was complaining of occasional cramping.  At that time, she   was 2 cm dilated.  Patient was given labor warnings.  She came in today for   her routine prenatal visit and stated that she was having worsening cramping   and low back pain and was unsure if she was having contractions.  The   patient's cervix in the office was 3 cm, 75% effaced, and -1 station.  Patient   was therefore sent to labor and delivery; since she is a previous    section at term and 3 cm dilated, the decision was made to proceed with a   repeat  section.  The patient was given IV fluids and despite the IV   fluids, she stated that the contractions were worsening; therefore, the    section was called and she underwent an uncomplicated repeat    section.    PAST MEDICAL HISTORY:  Obesity.    PAST SURGICAL HISTORY:  Previous  section x1.    MEDICATIONS:  She is on prenatal vitamins.    ALLERGIES:  No known drug allergies.    OBSTETRICAL HISTORY:   2, para 1, previous  section on   2015.  She had a primary  section for breech and spontaneous   rupture of membranes at 38 weeks.  Infant weighed 7 pounds.    GYNECOLOGIC HISTORY:  Patient started menstruating at age 10, has menstrual   cycles every 28 days, last about 5 days.  Her last Pap smear was on   2017, it was negative Pap, negative chlamydia, and negative gonorrhea.    Last menstrual period was on 2017.    SOCIAL HISTORY:  Patient denies tobacco, alcohol or drug  use.    PHYSICAL EXAMINATION:  VITAL SIGNS:  Patient's blood pressure today was 132/89.  Urine is negative   for protein.  Total weight gain 13 pounds.  Weight is 277 pounds, BMI 46.  GENERAL:  Pleasant female, in no acute distress.  LUNGS:  Clear to auscultation bilaterally.  CARDIOVASCULAR:  Regular rate and rhythm.  No murmur.  ABDOMEN:  Soft, obese, gravid uterus, nontender.  EXTREMITIES:  No calf tenderness.  PELVIC:  Sterile vaginal exam, she is 3 cm dilated, 75% effaced, and -1   station.  Patient's fetal heart tones 130s, category 1.  [place], rtia   every 2-3 minutes.  Sterile vaginal exam as above.    LABORATORY DATA:  Patient's prenatal labs, group B strep negative, hepatitis B   surface antigen negative, rubella immune.  She is B positive, antibody screen   negative, RPR nonreactive, HIV nonreactive.  Patient declined a quad screen.    Her one hour glucose was normal at 119 and her H and H was 12.7 and 39.2.    ASSESSMENTAND PLAN:  A 28-year-old  2, para 1, at 37 weeks and 6 days   with a due date of 2018 by a 9-week ultrasound.  1.  Previous  section.  Patient laboring with worsening painful   contractions and 3 cm dilated.  Therefore, at this time, patient is not a   tocolytic or steroid candidate and I would not send the patient home with   contractions.  Therefore, we are proceeding now with a repeat    section.  Risks, benefits, indications and alternatives have been discussed   with the patient and her , they have no unanswered questions and wants   to proceed.  2.  GBS is negative.  3.  Obesity, BMI of 46.  Patient is with a normal 1-hour glucose.       ____________________________________     MD HELDER MADRID / RANDY    DD:  2018 02:05:03  DT:  2018 04:07:07    D#:  [id number]  Job#:  [id number]    cc: BEAU COBIAN MD

## 2018-06-23 PROCEDURE — A9270 NON-COVERED ITEM OR SERVICE: HCPCS | Performed by: OBSTETRICS & GYNECOLOGY

## 2018-06-23 PROCEDURE — 700112 HCHG RX REV CODE 229: Performed by: OBSTETRICS & GYNECOLOGY

## 2018-06-23 PROCEDURE — A6250 SKIN SEAL PROTECT MOISTURIZR: HCPCS | Performed by: OBSTETRICS & GYNECOLOGY

## 2018-06-23 PROCEDURE — 770002 HCHG ROOM/CARE - OB PRIVATE (112)

## 2018-06-23 PROCEDURE — 700102 HCHG RX REV CODE 250 W/ 637 OVERRIDE(OP): Performed by: OBSTETRICS & GYNECOLOGY

## 2018-06-23 RX ADMIN — IBUPROFEN 600 MG: 600 TABLET, FILM COATED ORAL at 07:52

## 2018-06-23 RX ADMIN — DOCUSATE SODIUM 100 MG: 100 CAPSULE ORAL at 07:52

## 2018-06-23 RX ADMIN — OXYCODONE HYDROCHLORIDE AND ACETAMINOPHEN 1 TABLET: 10; 325 TABLET ORAL at 07:52

## 2018-06-23 RX ADMIN — Medication 1 TABLET: at 15:53

## 2018-06-23 RX ADMIN — IBUPROFEN 600 MG: 600 TABLET, FILM COATED ORAL at 15:54

## 2018-06-23 RX ADMIN — OXYCODONE AND ACETAMINOPHEN 1 TABLET: 5; 325 TABLET ORAL at 15:53

## 2018-06-23 ASSESSMENT — PAIN SCALES - GENERAL
PAINLEVEL_OUTOF10: 2
PAINLEVEL_OUTOF10: 6
PAINLEVEL_OUTOF10: 6
PAINLEVEL_OUTOF10: 2
PAINLEVEL_OUTOF10: 0

## 2018-06-23 NOTE — PROGRESS NOTES
Assessment done.  Care plan reviewed and pt progressing according to caremap.  FOB at bedside and very supportive. Reviewed infant security measures with pt per hospital protocol.  Pt advised of emergency cords in her room and get well channel.  Pt encouraged to wear supportive bra. See MAR for pain med administration throughout day.  On regular diet.  Pt advised to call for any needs.

## 2018-06-23 NOTE — PROGRESS NOTES
"POST PARTUM DAY #2 S/P RLTCS    S: Doing well.  Pain controlled.  Voiding spontaneously.  Tolerating a regular diet.  Passing gas.  Breast feeding.  Moderate lochia.    O:  Blood pressure 132/89, pulse 86, temperature 36.2 °C (97.1 °F), resp. rate 18, height 1.651 m (5' 5\"), weight (!) 125.6 kg (277 lb), SpO2 96 %, currently breastfeeding.    A, A, and O x 3 NAD    FF u/1    Incision - c/c/i     No c/c/e    Recent Labs      06/21/18   1400  06/22/18   0432   WBC  11.9*  12.1*   RBC  4.81  3.92*   HEMOGLOBIN  13.0  10.6*   HEMATOCRIT  39.0  31.6*   MCV  81.1*  80.6*   MCH  27.0  27.0   RDW  43.8  42.9   PLATELETCT  202  164   MPV  11.0  9.8   NEUTSPOLYS  77.10*   --    LYMPHOCYTES  14.60*   --    MONOCYTES  6.00   --    EOSINOPHILS  0.70   --    BASOPHILS  0.30   --        A/P: POD #2 s/p RLTCS    1.  Ambulate TID.  2.  ADAT.  3.  Anticipate D/C in the morning.  4.  Begin iron supplementation.    "

## 2018-06-23 NOTE — CARE PLAN
Problem: Potential for postpartum infection related to surgical incision, compromised uterine condition, urinary tract or respiratory compromise  Goal: Patient will be afebrile and free from signs and symptoms of infection  Outcome: PROGRESSING AS EXPECTED  Patient has no signs/symptoms of infection.  Vital signs stable.      Problem: Alteration in comfort related to surgical incision and/or after birth pains  Goal: Patient is able to ambulate, care for self and infant with acceptable pain level  Outcome: PROGRESSING AS EXPECTED  Patient states pain control is adequate.

## 2018-06-23 NOTE — PROGRESS NOTES
1900 Received bedside report from Stephanie ORDOÑEZ RN. Discussed plan of care. Patient will call for pain medication as needed. All needs met at this time.

## 2018-06-23 NOTE — CONSULTS
Vi Patterson R.N. Lactation Consultant Signed   Progress Notes Date of Service: 2018  5:46 PM         []Hide copied text  []Hover for attribution information  Attempted to see this mother/baby couplet 3 different times today. This is mother's 2nd baby.Baby was born at 37.6 weeks, , last night at . She thinks nursing is going well except that she's having a hard time latching baby to left breast (but will latch to right breast) and baby is sleepy at times. She is doing breast and bottle so we discussed the importance of breastfeeding first before any bottle feeding is done. The next time I checked her baby had latched and nursed for 5-10 min on the left side. Mom breast fed her first child and seems confident in her abilities. Will need follow up.

## 2018-06-24 VITALS
RESPIRATION RATE: 16 BRPM | BODY MASS INDEX: 46.15 KG/M2 | TEMPERATURE: 97.7 F | SYSTOLIC BLOOD PRESSURE: 126 MMHG | HEIGHT: 65 IN | DIASTOLIC BLOOD PRESSURE: 77 MMHG | WEIGHT: 277 LBS | OXYGEN SATURATION: 97 % | HEART RATE: 81 BPM

## 2018-06-24 PROCEDURE — A9270 NON-COVERED ITEM OR SERVICE: HCPCS | Performed by: OBSTETRICS & GYNECOLOGY

## 2018-06-24 PROCEDURE — 700102 HCHG RX REV CODE 250 W/ 637 OVERRIDE(OP): Performed by: OBSTETRICS & GYNECOLOGY

## 2018-06-24 PROCEDURE — 700112 HCHG RX REV CODE 229: Performed by: OBSTETRICS & GYNECOLOGY

## 2018-06-24 RX ORDER — IBUPROFEN 600 MG/1
600 TABLET ORAL EVERY 6 HOURS PRN
Qty: 30 TAB | Refills: 3 | Status: SHIPPED | OUTPATIENT
Start: 2018-06-24

## 2018-06-24 RX ORDER — OXYCODONE HYDROCHLORIDE AND ACETAMINOPHEN 5; 325 MG/1; MG/1
1-2 TABLET ORAL EVERY 4 HOURS PRN
Qty: 30 TAB | Refills: 0 | Status: SHIPPED | OUTPATIENT
Start: 2018-06-24 | End: 2018-07-01

## 2018-06-24 RX ADMIN — IBUPROFEN 600 MG: 600 TABLET, FILM COATED ORAL at 01:54

## 2018-06-24 RX ADMIN — IBUPROFEN 600 MG: 600 TABLET, FILM COATED ORAL at 08:18

## 2018-06-24 RX ADMIN — DOCUSATE SODIUM 100 MG: 100 CAPSULE ORAL at 08:18

## 2018-06-24 RX ADMIN — Medication 1 TABLET: at 08:18

## 2018-06-24 RX ADMIN — OXYCODONE AND ACETAMINOPHEN 1 TABLET: 5; 325 TABLET ORAL at 01:54

## 2018-06-24 ASSESSMENT — PAIN SCALES - GENERAL
PAINLEVEL_OUTOF10: 1
PAINLEVEL_OUTOF10: 6
PAINLEVEL_OUTOF10: 2
PAINLEVEL_OUTOF10: 2

## 2018-06-24 NOTE — PROGRESS NOTES
POD #3    S:  Pt doing well.  Minimal lochia.  Breast and bottle feeding. Nica reg diet. Passing flatus.  Ready to go home    O: T 98.4 P 9=81  /68  ABD: Obese, ND, minimally tender, FF below umb, BS + normoactive, incision C/D/I without erythema - interdry in place  EXT: +1 pedal edema, no cords or homans  H/H 10/31  B+  GBS neg    A/P:  POD #3 S/P repeat  C/S - doing well  1.  D/C home  2.  F/U Dr. Penn 2 weeks  3.  Rx for Percocet and motrin  4.  Wound care reviewed

## 2018-06-24 NOTE — DISCHARGE INSTRUCTIONS
POSTPARTUM DISCHARGE INSTRUCTIONS FOR MOM    YOB: 1990   Age: 28 y.o.               Admit Date: 2018     Discharge Date: 2018  Attending Doctor:  Rosemarie Penn M.D.                  Allergies:  Patient has no known allergies.    Discharged to home by car. Discharged via wheelchair, hospital escort: Yes.  Special equipment needed: Not Applicable  Belongings with: Personal  Be sure to schedule a follow-up appointment with your primary care doctor or any specialists as instructed.     Discharge Plan:   Diet Plan: Discussed  Activity Level: Discussed  Confirmed Follow up Appointment: Patient to Call and Schedule Appointment  Confirmed Symptoms Management: Discussed  Medication Reconciliation Updated: Yes  Influenza Vaccine Indication: Indicated: Not available from distributor/    REASONS TO CALL YOUR OBSTETRICIAN:  1.   Persistent fever or shaking chills (Temperature higher than 100.4)  2.   Heavy bleeding (soaking more than 1 pad per hour); Passing clots  3.   Foul odor from vagina  4.   Mastitis (Breast infection; breast pain, chills, fever, redness)  5.   Urinary pain, burning or frequency  6.   Episiotomy infection  7.   Abdominal incision infection  8.   Severe depression longer than 24 hours    HAND WASHING  · Prior to handling the baby.  · Before breastfeeding or bottle feeding baby.  · After using the bathroom or changing the baby's diaper.    WOUND CARE  Ask your physician for additional care instructions.  In general:    ·  Incision:      · Keep clean and dry.    · Do NOT lift anything heavier than your baby for up to 6 weeks.    · There should not be any opening or pus.      VAGINAL CARE  · Nothing inside vagina for 6 weeks: no sexual intercourse, tampons or douching.  · Bleeding may continue for 2-4 weeks.  Amount may vary.    · Call your physician for heavy bleeding which means soaking more than 1 pad per hour    BIRTH CONTROL  · It is possible to become  "pregnant at any time after delivery and while breastfeeding.  · Plan to discuss a method of birth control with your physician at your follow up visit. visit.    DIET AND ELIMINATION  · Eating more fiber (bran cereal, fruits, and vegetables) and drinking plenty of fluids will help to avoid constipation.  · Urinary frequency after childbirth is normal.    POSTPARTUM BLUES  During the first few days after birth, you may experience a sense of the \"blues\" which may include impatience, irritability or even crying.  These feeling come and go quickly.  However, as many as 1 in 10 women experience emotional symptoms known as postpartum depression.    Postpartum depression:  May start as early as the second or third day after delivery or take several weeks or months to develop.  Symptoms of \"blues\" are present, but are more intense:  Crying spells; loss of appetite; feelings of hopelessness or loss of control; fear of touching the baby; over concern or no concern at all about the baby; little or no concern about your own appearance/caring for yourself; and/or inability to sleep or excessive sleeping.  Contact your physician if you are experiencing any of these symptoms.    Crisis Hotline:  · [place] Crisis Hotline:  9-834-MIFRHJD  Or 1-688.244.4901  · [place] Crisis Hotline:  1-130.133.2767  Or 357-689-9963    PREVENTING SHAKEN BABY:  If you are angry or stressed, PUT THE BABY IN THE CRIB, step away, take some deep breaths, and wait until you are calm to care for the baby.  DO NOT SHAKE THE BABY.  You are not alone, call a supporter for help.    · Crisis Call Center 24/7 crisis line 640-229-6093 or 1-523.182.7356  · You can also text them, text \"ANSWER\" to 136369    QUIT SMOKING/TOBACCO USE:  I understand the use of any tobacco products increases my chance of suffering from future heart disease and could cause other illnesses which may shorten my life. Quitting the use of tobacco products is the single most important thing I " can do to improve my health. For further information on smoking / tobacco cessation call a Toll Free Quit Line at 1-708.801.2474 (*[employer] [place]) or 1-290.974.4863 (American [employer] Association) or you can access the web based program at www.lungusa.org.    · [place] Tobacco Users Help Line:  (720) 151-3140       Toll Free: 1-452.572.1039  · Quit Tobacco Program [facility] (462)787-4478    DEPRESSION / SUICIDE RISK:  As you are discharged from this [facility], it is important to learn how to keep safe from harming yourself.    Recognize the warning signs:  · Abrupt changes in personality, positive or negative- including increase in energy   · Giving away possessions  · Change in eating patterns- significant weight changes-  positive or negative  · Change in sleeping patterns- unable to sleep or sleeping all the time   · Unwillingness or inability to communicate  · Depression  · Unusual sadness, discouragement and loneliness  · Talk of wanting to die  · Neglect of personal appearance   · Rebelliousness- reckless behavior  · Withdrawal from people/activities they love  · Confusion- inability to concentrate     If you or a loved one observes any of these behaviors or has concerns about self-harm, here's what you can do:  · Talk about it- your feelings and reasons for harming yourself  · Remove any means that you might use to hurt yourself (examples: pills, rope, extension cords, firearm)  · Get professional help from the community (Mental Health, Substance Abuse, psychological counseling)  · Do not be alone:Call your Safe Contact- someone whom you trust who will be there for you.  · Call your local CRISIS HOTLINE 052-7668 or 169-525-9263  · Call your local Children's Mobile Crisis Response Team [place] (632) 851-9086 or www.Catmoji  · Call the toll free National Suicide Prevention Hotlines   · National Suicide Prevention Lifeline 938-489-PIWL (2507)  · National  [facility] 800-SUICIDE (627-8365)    DISCHARGE SURVEY:  Thank you for choosing [facility].  We hope we provided you with very good care.  You may be receiving a survey in the mail.  Please fill it out.  Your opinion is valuable to us.    ADDITIONAL EDUCATIONAL MATERIALS GIVEN TO PATIENT:        My signature on this form indicates that:  1.  I have reviewed and understand the above information  2.  My questions regarding this information have been answered to my satisfaction.  3.  I have formulated a plan with my discharge nurse to obtain my prescribed medication for home.

## 2018-06-24 NOTE — PROGRESS NOTES
Mother pumping when LC arrived, adequate colostrum production noted, mother states understanding of proper pump use and settings and denies pain and/or need for assistance when BF, mother states baby is latching and actively sucking a bit better than before but is still not regularly doing so, discussed POC.    Plan is to attempt to BF Q 3 hours, for no/suboptimal feeding pump for 15 minutes and supplement with MBM/formula.    Supplement guidelines provided and explained.    Mother to  HG rental pump today from [employer] prior to discharge.    Mother has HHP, paperwork for personal pump provided, encouraged to call to schedule outpatient appointment with consultant.    Encouraged to call for assistance as needed.

## 2018-06-24 NOTE — CARE PLAN
Problem: Altered physiologic condition related to postoperative  delivery  Goal: Patient physiologically stable as evidenced by normal lochia, palpable uterine involution and vital signs within normal limits  Outcome: PROGRESSING AS EXPECTED  Fundus is firm and 2 finger breadth below the umbilicus. Lochia is light. Vital signs are within normal limits.     Problem: Potential for postpartum infection related to surgical incision, compromised uterine condition, urinary tract or respiratory compromise  Goal: Patient will be afebrile and free from signs and symptoms of infection  Outcome: PROGRESSING AS EXPECTED  Patient remains afebrile.

## 2018-06-24 NOTE — PROGRESS NOTES
1900 Received bedside report from [facility] [place]. Discussed plan of care. Patient will call for pain medication as needed. All needs met at this time.

## 2018-07-09 NOTE — DISCHARGE SUMMARY
DATE OF ADMISSION:  2018    DATE OF DISCHARGE:  2018    ADMITTING DIAGNOSES:  1.  Intrauterine pregnancy at 37 weeks and 6 days.  2.  Previous  section.  3.  Early labor with worsening contractions.  4.  Morbidly obese with body mass index of 46.    PROCEDURES:  Repeat low transverse  section via Pfannenstiel skin   incision.    HOSPITAL COURSE:  This patient is a 28-year-old  2, now para 2 that was   admitted to the hospital with worsening contractions.  Patient had a previous    section and was 3 cm dilated.  Despite IV fluid hydration, she   continued to contract and they were getting more painful.  Despite not having   cervical change, the patient had a previous  section with worsening   contractions and therefore, the decision was made to proceed with a repeat    section.  She underwent a repeat  section on 2018 that   was uncomplicated.  She had a vertex female with Apgars 6 and 8, 6 pound 8   ounce infant.  Postoperatively, the patient did well.  By postop day #1, the   patient was tolerating a regular diet, voiding and ambulating with good pain   control.  Her vitals were stable.  Her abdomen was soft.  Her wound was clean,   dry and intact.  By postop day #2, patient continued to do well.  She was   both bottle feeding and breastfeeding the baby.  She was having good pain   control, passing flatus, blood pressures were stable in the 130s/80s and the   rest of the physical exam was normal including her wound which was clean, dry,   and intact.  Her preop H and H were 13.0 and 39.0, and postop H and H 10.6   and 31.6.  By postop day #3, the patient desired to go home.  She was   tolerating a regular diet, voiding and ambulating, having good pain control,   breastfeeding and bottle feeding the baby without any difficulty.  Her vitals   were stable.  Her wound was clean, dry and intact and therefore on postop day   #3, the patient was  discharged home, hemodynamically stable with baby.  She   was discharged home on Percocet, ibuprofen, and Colace.  She is to see me in 2   weeks and again in 6 weeks and she is to continue with pelvic rest.       ____________________________________     MD HELDER MADRID / RANDY    DD:  07/09/2018 07:43:30  DT:  07/09/2018 09:29:58    D#:  [id number]  Job#:  [id number]

## 2018-08-06 ENCOUNTER — HOSPITAL ENCOUNTER (OUTPATIENT)
Dept: LAB | Facility: MEDICAL CENTER | Age: 28
End: 2018-08-06
Attending: OBSTETRICS & GYNECOLOGY
Payer: COMMERCIAL

## 2018-08-06 PROCEDURE — 88175 CYTOPATH C/V AUTO FLUID REDO: CPT

## 2018-08-07 LAB — CYTOLOGY REG CYTOL: NORMAL

## 2019-10-14 ENCOUNTER — IMMUNIZATION (OUTPATIENT)
Dept: SOCIAL WORK | Facility: CLINIC | Age: 29
End: 2019-10-14
Payer: COMMERCIAL

## 2019-10-14 DIAGNOSIS — Z23 NEED FOR VACCINATION: ICD-10-CM

## 2019-10-14 PROCEDURE — 90686 IIV4 VACC NO PRSV 0.5 ML IM: CPT | Performed by: REGISTERED NURSE

## 2019-10-14 PROCEDURE — 90471 IMMUNIZATION ADMIN: CPT | Performed by: REGISTERED NURSE

## 2023-04-19 ENCOUNTER — EMPLOYEE HEALTH (OUTPATIENT)
Dept: OCCUPATIONAL MEDICINE | Facility: CLINIC | Age: 33
End: 2023-04-19

## 2023-04-19 ENCOUNTER — EH NON-PROVIDER (OUTPATIENT)
Dept: OCCUPATIONAL MEDICINE | Facility: CLINIC | Age: 33
End: 2023-04-19

## 2023-04-19 ENCOUNTER — HOSPITAL ENCOUNTER (OUTPATIENT)
Facility: MEDICAL CENTER | Age: 33
End: 2023-04-19
Attending: PREVENTIVE MEDICINE
Payer: COMMERCIAL

## 2023-04-19 DIAGNOSIS — Z02.89 VISIT FOR OCCUPATIONAL HEALTH EXAMINATION: ICD-10-CM

## 2023-04-19 DIAGNOSIS — Z02.89 VISIT FOR OCCUPATIONAL HEALTH EXAMINATION: Primary | ICD-10-CM

## 2023-04-19 LAB
AMP AMPHETAMINE: NORMAL
BAR BARBITURATES: NORMAL
BZO BENZODIAZEPINES: NORMAL
COC COCAINE: NORMAL
INT CON NEG: NORMAL
INT CON POS: NORMAL
MDMA ECSTASY: NORMAL
MET METHAMPHETAMINES: NORMAL
MTD METHADONE: NORMAL
OPI OPIATES: NORMAL
OXY OXYCODONE: NORMAL
PCP PHENCYCLIDINE: NORMAL
POC URINE DRUG SCREEN OCDRS: NORMAL
THC: NORMAL

## 2023-04-19 PROCEDURE — 86787 VARICELLA-ZOSTER ANTIBODY: CPT | Performed by: PREVENTIVE MEDICINE

## 2023-04-19 PROCEDURE — 8915 PR COMPREHENSIVE PHYSICAL: Performed by: PREVENTIVE MEDICINE

## 2023-04-19 PROCEDURE — 86480 TB TEST CELL IMMUN MEASURE: CPT | Performed by: PREVENTIVE MEDICINE

## 2023-04-19 PROCEDURE — 80305 DRUG TEST PRSMV DIR OPT OBS: CPT | Performed by: PREVENTIVE MEDICINE

## 2023-04-21 LAB
GAMMA INTERFERON BACKGROUND BLD IA-ACNC: 0.19 IU/ML
M TB IFN-G BLD-IMP: NEGATIVE
M TB IFN-G CD4+ BCKGRND COR BLD-ACNC: 0.07 IU/ML
MITOGEN IGNF BCKGRD COR BLD-ACNC: >10 IU/ML
QFT TB2 - NIL TBQ2: -0.01 IU/ML

## 2023-04-22 LAB — VZV IGG SER IA-ACNC: 1121 IV

## 2023-05-12 ENCOUNTER — EH NON-PROVIDER (OUTPATIENT)
Dept: OCCUPATIONAL MEDICINE | Facility: CLINIC | Age: 33
End: 2023-05-12

## (undated) DEVICE — TRAY SPINAL ANESTHESIA NON-SAFETY (10/CA)

## (undated) DEVICE — TUBING CLEARLINK DUO-VENT - C-FLO (48EA/CA)

## (undated) DEVICE — KIT  I.V. START (100EA/CA)

## (undated) DEVICE — GLOVE BIOGEL SZ 6.5 SURGICAL PF LTX (50PR/BX 4BX/CA)

## (undated) DEVICE — DETERGENT RENUZYME PLUS 10 OZ PACKET (50/BX)

## (undated) DEVICE — SUTURE 4-0 27IN VCRL PLUS ANTI (36PK/BX)

## (undated) DEVICE — PACK C-SECTION (2EA/CA)

## (undated) DEVICE — GLOVE BIOGEL SZ 7 SURGICAL PF LTX - (50PR/BX 4BX/CA)

## (undated) DEVICE — KIT SKIN PREP SURG. SOLUTION - DURAPREP (20 KT/CA)

## (undated) DEVICE — SODIUM CHL IRRIGATION 0.9% 1000ML (12EA/CA)

## (undated) DEVICE — SUTURE 0 VICRYL PLUS CTX - 36 INCH (36/BX)

## (undated) DEVICE — SET EXTENSION WITH 2 PORTS (48EA/CA) ***PART #2C8610 IS A SUBSTITUTE*****

## (undated) DEVICE — CATHETER IV NON-SAFETY 18 GA X 1 1/4 (50/BX 4BX/CA)

## (undated) DEVICE — SUTURE 2-0 VICRYL PLUS CT-1 36 (36PK/BX)"

## (undated) DEVICE — HEAD HOLDER JUNIOR/ADULT

## (undated) DEVICE — SUTURE 3-0 VICRYL PLUS CT-1 - 36 INCH (36/BX)

## (undated) DEVICE — WATER IRRIG. STER. 1500 ML - (9/CA)

## (undated) DEVICE — CLOSURE SKIN STRIP 1/2 X 4 IN - (STERI STRIP) (50/BX 4BX/CA)

## (undated) DEVICE — ELECTRODE DUAL RETURN W/ CORD - (50/PK)

## (undated) DEVICE — SWABSTICK BENZOIN SINGLE (50EA/BX)